# Patient Record
Sex: FEMALE | Race: WHITE | NOT HISPANIC OR LATINO | URBAN - METROPOLITAN AREA
[De-identification: names, ages, dates, MRNs, and addresses within clinical notes are randomized per-mention and may not be internally consistent; named-entity substitution may affect disease eponyms.]

---

## 2019-12-19 ENCOUNTER — INPATIENT (INPATIENT)
Facility: HOSPITAL | Age: 84
LOS: 3 days | Discharge: HOME | DRG: 917 | End: 2019-12-23
Attending: INTERNAL MEDICINE | Admitting: INTERNAL MEDICINE
Payer: MEDICARE

## 2019-12-19 VITALS
SYSTOLIC BLOOD PRESSURE: 176 MMHG | HEART RATE: 92 BPM | RESPIRATION RATE: 18 BRPM | WEIGHT: 125 LBS | DIASTOLIC BLOOD PRESSURE: 99 MMHG | HEIGHT: 60 IN

## 2019-12-19 LAB
ALBUMIN SERPL ELPH-MCNC: 3.6 G/DL — SIGNIFICANT CHANGE UP (ref 3.3–5)
ALP SERPL-CCNC: 96 U/L — SIGNIFICANT CHANGE UP (ref 40–120)
ALT FLD-CCNC: 14 U/L — SIGNIFICANT CHANGE UP (ref 10–45)
ANION GAP SERPL CALC-SCNC: 14 MMOL/L — SIGNIFICANT CHANGE UP (ref 5–17)
APPEARANCE UR: ABNORMAL
APTT BLD: 26.1 SEC — LOW (ref 27.5–36.3)
AST SERPL-CCNC: 32 U/L — SIGNIFICANT CHANGE UP (ref 10–40)
BASOPHILS # BLD AUTO: 0.03 K/UL — SIGNIFICANT CHANGE UP (ref 0–0.2)
BASOPHILS NFR BLD AUTO: 0.3 % — SIGNIFICANT CHANGE UP (ref 0–2)
BILIRUB SERPL-MCNC: 1 MG/DL — SIGNIFICANT CHANGE UP (ref 0.2–1.2)
BILIRUB UR-MCNC: NEGATIVE — SIGNIFICANT CHANGE UP
BUN SERPL-MCNC: 16 MG/DL — SIGNIFICANT CHANGE UP (ref 7–23)
CALCIUM SERPL-MCNC: 9.6 MG/DL — SIGNIFICANT CHANGE UP (ref 8.4–10.5)
CHLORIDE SERPL-SCNC: 99 MMOL/L — SIGNIFICANT CHANGE UP (ref 96–108)
CO2 SERPL-SCNC: 27 MMOL/L — SIGNIFICANT CHANGE UP (ref 22–31)
COLOR SPEC: YELLOW — SIGNIFICANT CHANGE UP
CREAT SERPL-MCNC: 1.08 MG/DL — SIGNIFICANT CHANGE UP (ref 0.5–1.3)
DIFF PNL FLD: ABNORMAL
EOSINOPHIL # BLD AUTO: 0.04 K/UL — SIGNIFICANT CHANGE UP (ref 0–0.5)
EOSINOPHIL NFR BLD AUTO: 0.4 % — SIGNIFICANT CHANGE UP (ref 0–6)
GLUCOSE SERPL-MCNC: 94 MG/DL — SIGNIFICANT CHANGE UP (ref 70–99)
GLUCOSE UR QL: NEGATIVE — SIGNIFICANT CHANGE UP
HCT VFR BLD CALC: 37.1 % — SIGNIFICANT CHANGE UP (ref 34.5–45)
HGB BLD-MCNC: 11.9 G/DL — SIGNIFICANT CHANGE UP (ref 11.5–15.5)
IMM GRANULOCYTES NFR BLD AUTO: 0.4 % — SIGNIFICANT CHANGE UP (ref 0–1.5)
INR BLD: 1.29 — HIGH (ref 0.88–1.16)
KETONES UR-MCNC: ABNORMAL MG/DL
LEUKOCYTE ESTERASE UR-ACNC: NEGATIVE — SIGNIFICANT CHANGE UP
LYMPHOCYTES # BLD AUTO: 1.97 K/UL — SIGNIFICANT CHANGE UP (ref 1–3.3)
LYMPHOCYTES # BLD AUTO: 20.3 % — SIGNIFICANT CHANGE UP (ref 13–44)
MCHC RBC-ENTMCNC: 30.6 PG — SIGNIFICANT CHANGE UP (ref 27–34)
MCHC RBC-ENTMCNC: 32.1 GM/DL — SIGNIFICANT CHANGE UP (ref 32–36)
MCV RBC AUTO: 95.4 FL — SIGNIFICANT CHANGE UP (ref 80–100)
MONOCYTES # BLD AUTO: 0.85 K/UL — SIGNIFICANT CHANGE UP (ref 0–0.9)
MONOCYTES NFR BLD AUTO: 8.7 % — SIGNIFICANT CHANGE UP (ref 2–14)
NEUTROPHILS # BLD AUTO: 6.79 K/UL — SIGNIFICANT CHANGE UP (ref 1.8–7.4)
NEUTROPHILS NFR BLD AUTO: 69.9 % — SIGNIFICANT CHANGE UP (ref 43–77)
NITRITE UR-MCNC: NEGATIVE — SIGNIFICANT CHANGE UP
NRBC # BLD: 0 /100 WBCS — SIGNIFICANT CHANGE UP (ref 0–0)
PH UR: 6.5 — SIGNIFICANT CHANGE UP (ref 5–8)
PLATELET # BLD AUTO: 211 K/UL — SIGNIFICANT CHANGE UP (ref 150–400)
POTASSIUM SERPL-MCNC: 3.6 MMOL/L — SIGNIFICANT CHANGE UP (ref 3.5–5.3)
POTASSIUM SERPL-SCNC: 3.6 MMOL/L — SIGNIFICANT CHANGE UP (ref 3.5–5.3)
PROT SERPL-MCNC: 6.6 G/DL — SIGNIFICANT CHANGE UP (ref 6–8.3)
PROT UR-MCNC: ABNORMAL MG/DL
PROTHROM AB SERPL-ACNC: 14.7 SEC — HIGH (ref 10–12.9)
RBC # BLD: 3.89 M/UL — SIGNIFICANT CHANGE UP (ref 3.8–5.2)
RBC # FLD: 14.5 % — SIGNIFICANT CHANGE UP (ref 10.3–14.5)
SODIUM SERPL-SCNC: 140 MMOL/L — SIGNIFICANT CHANGE UP (ref 135–145)
SP GR SPEC: 1.02 — SIGNIFICANT CHANGE UP (ref 1–1.03)
UROBILINOGEN FLD QL: 0.2 E.U./DL — SIGNIFICANT CHANGE UP
WBC # BLD: 9.72 K/UL — SIGNIFICANT CHANGE UP (ref 3.8–10.5)
WBC # FLD AUTO: 9.72 K/UL — SIGNIFICANT CHANGE UP (ref 3.8–10.5)

## 2019-12-19 PROCEDURE — 70450 CT HEAD/BRAIN W/O DYE: CPT | Mod: 26

## 2019-12-19 PROCEDURE — 72125 CT NECK SPINE W/O DYE: CPT | Mod: 26

## 2019-12-19 PROCEDURE — 71045 X-RAY EXAM CHEST 1 VIEW: CPT | Mod: 26

## 2019-12-19 PROCEDURE — 99285 EMERGENCY DEPT VISIT HI MDM: CPT

## 2019-12-19 PROCEDURE — 71260 CT THORAX DX C+: CPT | Mod: 26

## 2019-12-19 PROCEDURE — 74177 CT ABD & PELVIS W/CONTRAST: CPT | Mod: 26

## 2019-12-19 NOTE — ED ADULT NURSE NOTE - NSIMPLEMENTINTERV_GEN_ALL_ED
Implemented All Fall with Harm Risk Interventions:  [place] to call system. Call bell, personal items and telephone within reach. Instruct patient to call for assistance. Room bathroom lighting operational. Non-slip footwear when patient is off stretcher. Physically safe environment: no spills, clutter or unnecessary equipment. Stretcher in lowest position, wheels locked, appropriate side rails in place. Provide visual cue, wrist band, yellow gown, etc. Monitor gait and stability. Monitor for mental status changes and reorient to person, place, and time. Review medications for side effects contributing to fall risk. Reinforce activity limits and safety measures with patient and family. Provide visual clues: red socks.

## 2019-12-19 NOTE — ED PROVIDER NOTE - PHYSICAL EXAMINATION
CONSTITUTIONAL: Well appearing, well nourished, awake, alert and in no apparent distress.  HEENT: Head is atraumatic. Eyes clear bilaterally, normal EOMI. Airway patent.  CARDIAC: Normal rate, regular rhythm.  Heart sounds S1, S2.   RESPIRATORY: Breath sounds clear and equal bilaterally. no tachypnea, respiratory distress.   GASTROINTESTINAL: Abdomen soft, non-tender, no guarding, distension.  PELVIC: Prolapsed uterus  MUSCULOSKELETAL: Spine appears normal, no midline spinal tenderness, range of motion is not limited, no muscle or joint tenderness. no bony tenderness. no JVD, peripheral edema.   NEUROLOGICAL: Alert and oriented, no focal deficits, no motor or sensory deficits.  SKIN: Skin normal color for race, warm, dry and intact. No evidence of rash.  PSYCHIATRIC: Alert and oriented to person, place, time/situation. normal mood and affect. no apparent risk to self or others. CONSTITUTIONAL: Well appearing, well nourished, awake, alert and in no apparent distress.  HEENT: Head is atraumatic. Eyes clear bilaterally, normal EOMI. Airway patent.  CARDIAC: Normal rate, regular rhythm.  Heart sounds S1, S2.   RESPIRATORY: Breath sounds clear and equal bilaterally. no tachypnea, respiratory distress.   GASTROINTESTINAL: Abdomen soft, non-tender, no guarding, distension.  PELVIC: Prolapsed uterus  MUSCULOSKELETAL: Spine appears normal, no midline spinal tenderness, range of motion is not limited, no muscle or joint tenderness. no bony tenderness. no JVD, peripheral edema.   NEUROLOGICAL: o focal deficits, no motor or sensory deficits.  SKIN: Skin normal color for race, warm, dry and intact. No evidence of rash.

## 2019-12-19 NOTE — ED PROVIDER NOTE - CLINICAL SUMMARY MEDICAL DECISION MAKING FREE TEXT BOX
89 y/o F with a PMHx of HTN, and glaucoma p/w decreased PO intake and is also found to have bilateral pleural effusion. Pt w/ no respiratory distress noted, but will admit for continued PT OT. 89 y/o F with a PMHx of HTN, and glaucoma p/w decreased PO intake, s/p fall. Abnormal CXR followed up with CTs, also in light on unwitnessed fall and poor historian. CT w no acute traumatic injury, but large pleural effusions, daughter stating intermittently pt getting sob, but no sx at present, no hypoxia.

## 2019-12-19 NOTE — ED PROVIDER NOTE - OBJECTIVE STATEMENT
89 y/o F with a PMHx of HTN, and glaucoma presents to the ED s/p a mechanical fall 4 D ago. Pt's daughter reports hearing a thud in the kitchen and then seeing the pt on the floor. Daughter suspects that the pt fell asleep sitting on the kitchen table, and then subsequently fell off because the chair she was sleeping in did not have arms. Since then, the pt has decreased PO intake and also has difficulty getting up from her chair since yesterday. She also reports generalized weakness. Pt denies any other complaints. Daughter also noted a cough a recent diarrhea.

## 2019-12-19 NOTE — ED ADULT NURSE NOTE - OBJECTIVE STATEMENT
Patient sent in by Dr. Adler for weakness x 3 days. Per family at side, patient has only been drinking for 3 days and is no longer walking. Patient afebrile. Denies CP or SOB at this time. Patient sent in by Dr. Adler for weakness x 3 days. Per family at side, patient has only been drinking for 3 days and is no longer walking. Patient afebrile. Denies CP or SOB at this time. Patient asking for food during assessment.

## 2019-12-20 DIAGNOSIS — Z29.9 ENCOUNTER FOR PROPHYLACTIC MEASURES, UNSPECIFIED: ICD-10-CM

## 2019-12-20 DIAGNOSIS — M25.411 EFFUSION, RIGHT SHOULDER: ICD-10-CM

## 2019-12-20 DIAGNOSIS — G93.41 METABOLIC ENCEPHALOPATHY: ICD-10-CM

## 2019-12-20 DIAGNOSIS — R53.1 WEAKNESS: ICD-10-CM

## 2019-12-20 DIAGNOSIS — J90 PLEURAL EFFUSION, NOT ELSEWHERE CLASSIFIED: ICD-10-CM

## 2019-12-20 DIAGNOSIS — I10 ESSENTIAL (PRIMARY) HYPERTENSION: ICD-10-CM

## 2019-12-20 DIAGNOSIS — I48.91 UNSPECIFIED ATRIAL FIBRILLATION: ICD-10-CM

## 2019-12-20 DIAGNOSIS — Z91.89 OTHER SPECIFIED PERSONAL RISK FACTORS, NOT ELSEWHERE CLASSIFIED: ICD-10-CM
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
## 2019-12-20 DIAGNOSIS — E07.89 OTHER SPECIFIED DISORDERS OF THYROID: ICD-10-CM

## 2019-12-20 DIAGNOSIS — G92 TOXIC ENCEPHALOPATHY: ICD-10-CM

## 2019-12-20 DIAGNOSIS — H40.2223 CHRONIC ANGLE-CLOSURE GLAUCOMA, LEFT EYE, SEVERE STAGE: ICD-10-CM

## 2019-12-20 LAB
ANION GAP SERPL CALC-SCNC: 15 MMOL/L — SIGNIFICANT CHANGE UP (ref 5–17)
APTT BLD: 27.6 SEC — SIGNIFICANT CHANGE UP (ref 27.5–36.3)
BUN SERPL-MCNC: 20 MG/DL — SIGNIFICANT CHANGE UP (ref 7–23)
CALCIUM SERPL-MCNC: 8.9 MG/DL — SIGNIFICANT CHANGE UP (ref 8.4–10.5)
CHLORIDE SERPL-SCNC: 98 MMOL/L — SIGNIFICANT CHANGE UP (ref 96–108)
CO2 SERPL-SCNC: 24 MMOL/L — SIGNIFICANT CHANGE UP (ref 22–31)
CREAT SERPL-MCNC: 1.15 MG/DL — SIGNIFICANT CHANGE UP (ref 0.5–1.3)
GLUCOSE SERPL-MCNC: 117 MG/DL — HIGH (ref 70–99)
HCT VFR BLD CALC: 35.2 % — SIGNIFICANT CHANGE UP (ref 34.5–45)
HGB BLD-MCNC: 11.3 G/DL — LOW (ref 11.5–15.5)
INR BLD: 1.4 — HIGH (ref 0.88–1.16)
MAGNESIUM SERPL-MCNC: 1.8 MG/DL — SIGNIFICANT CHANGE UP (ref 1.6–2.6)
MCHC RBC-ENTMCNC: 30.7 PG — SIGNIFICANT CHANGE UP (ref 27–34)
MCHC RBC-ENTMCNC: 32.1 GM/DL — SIGNIFICANT CHANGE UP (ref 32–36)
MCV RBC AUTO: 95.7 FL — SIGNIFICANT CHANGE UP (ref 80–100)
NRBC # BLD: 0 /100 WBCS — SIGNIFICANT CHANGE UP (ref 0–0)
NT-PROBNP SERPL-SCNC: 6889 PG/ML — HIGH (ref 0–300)
PLATELET # BLD AUTO: 198 K/UL — SIGNIFICANT CHANGE UP (ref 150–400)
POTASSIUM SERPL-MCNC: 3.2 MMOL/L — LOW (ref 3.5–5.3)
POTASSIUM SERPL-SCNC: 3.2 MMOL/L — LOW (ref 3.5–5.3)
PROTHROM AB SERPL-ACNC: 16 SEC — HIGH (ref 10–12.9)
RBC # BLD: 3.68 M/UL — LOW (ref 3.8–5.2)
RBC # FLD: 14.6 % — HIGH (ref 10.3–14.5)
SODIUM SERPL-SCNC: 137 MMOL/L — SIGNIFICANT CHANGE UP (ref 135–145)
TSH SERPL-MCNC: 0.59 UIU/ML — SIGNIFICANT CHANGE UP (ref 0.35–4.94)
WBC # BLD: 9.38 K/UL — SIGNIFICANT CHANGE UP (ref 3.8–10.5)
WBC # FLD AUTO: 9.38 K/UL — SIGNIFICANT CHANGE UP (ref 3.8–10.5)

## 2019-12-20 PROCEDURE — 93306 TTE W/DOPPLER COMPLETE: CPT | Mod: 26

## 2019-12-20 PROCEDURE — 76536 US EXAM OF HEAD AND NECK: CPT | Mod: 26

## 2019-12-20 PROCEDURE — 72170 X-RAY EXAM OF PELVIS: CPT | Mod: 26,59

## 2019-12-20 PROCEDURE — 73522 X-RAY EXAM HIPS BI 3-4 VIEWS: CPT | Mod: 26

## 2019-12-20 PROCEDURE — 99223 1ST HOSP IP/OBS HIGH 75: CPT

## 2019-12-20 RX ORDER — LISINOPRIL 2.5 MG/1
40 TABLET ORAL ONCE
Refills: 0 | Status: COMPLETED | OUTPATIENT
Start: 2019-12-20 | End: 2019-12-20

## 2019-12-20 RX ORDER — MAGNESIUM SULFATE 500 MG/ML
2 VIAL (ML) INJECTION ONCE
Refills: 0 | Status: COMPLETED | OUTPATIENT
Start: 2019-12-20 | End: 2019-12-20

## 2019-12-20 RX ORDER — ACETAMINOPHEN 500 MG
650 TABLET ORAL EVERY 6 HOURS
Refills: 0 | Status: DISCONTINUED | OUTPATIENT
Start: 2019-12-20 | End: 2019-12-23

## 2019-12-20 RX ORDER — LISINOPRIL 2.5 MG/1
1 TABLET ORAL
Qty: 0 | Refills: 0 | DISCHARGE

## 2019-12-20 RX ORDER — ENOXAPARIN SODIUM 100 MG/ML
30 INJECTION SUBCUTANEOUS EVERY 24 HOURS
Refills: 0 | Status: DISCONTINUED | OUTPATIENT
Start: 2019-12-20 | End: 2019-12-23

## 2019-12-20 RX ORDER — MIRTAZAPINE 45 MG/1
7.5 TABLET, ORALLY DISINTEGRATING ORAL AT BEDTIME
Refills: 0 | Status: DISCONTINUED | OUTPATIENT
Start: 2019-12-20 | End: 2019-12-23

## 2019-12-20 RX ORDER — LISINOPRIL 2.5 MG/1
20 TABLET ORAL EVERY 24 HOURS
Refills: 0 | Status: DISCONTINUED | OUTPATIENT
Start: 2019-12-20 | End: 2019-12-23

## 2019-12-20 RX ORDER — ACETAMINOPHEN 500 MG
650 TABLET ORAL ONCE
Refills: 0 | Status: COMPLETED | OUTPATIENT
Start: 2019-12-20 | End: 2019-12-20

## 2019-12-20 RX ORDER — POTASSIUM CHLORIDE 20 MEQ
40 PACKET (EA) ORAL EVERY 4 HOURS
Refills: 0 | Status: COMPLETED | OUTPATIENT
Start: 2019-12-20 | End: 2019-12-20

## 2019-12-20 RX ADMIN — Medication 650 MILLIGRAM(S): at 07:13

## 2019-12-20 RX ADMIN — Medication 650 MILLIGRAM(S): at 06:13

## 2019-12-20 RX ADMIN — Medication 40 MILLIEQUIVALENT(S): at 12:26

## 2019-12-20 RX ADMIN — Medication 40 MILLIEQUIVALENT(S): at 17:47

## 2019-12-20 RX ADMIN — Medication 650 MILLIGRAM(S): at 14:43

## 2019-12-20 RX ADMIN — ENOXAPARIN SODIUM 30 MILLIGRAM(S): 100 INJECTION SUBCUTANEOUS at 12:25

## 2019-12-20 RX ADMIN — Medication 50 GRAM(S): at 07:55

## 2019-12-20 RX ADMIN — Medication 650 MILLIGRAM(S): at 02:55

## 2019-12-20 RX ADMIN — LISINOPRIL 40 MILLIGRAM(S): 2.5 TABLET ORAL at 01:57

## 2019-12-20 RX ADMIN — Medication 650 MILLIGRAM(S): at 01:49

## 2019-12-20 NOTE — H&P ADULT - PROBLEM SELECTOR PLAN 4
EKG in ED with Afib 82bph, incomplete LBBB, left axis deviation, , poor R wave progression. No known Afib hx. CHADsVAS 2-3 would qualify for AC however as recurrent falls and joint effusion will hold AC tonight and discuss with family in AM  -AM EKG

## 2019-12-20 NOTE — H&P ADULT - NSHPPHYSICALEXAM_GEN_ALL_CORE
.  VITAL SIGNS:  T(C): 36.6 (12-19-19 @ 23:40), Max: 36.6 (12-19-19 @ 23:40)  T(F): 97.9 (12-19-19 @ 23:40), Max: 97.9 (12-19-19 @ 23:40)  HR: 84 (12-19-19 @ 23:40) (64 - 92)  BP: 178/100 (12-19-19 @ 23:40) (139/85 - 178/100)  BP(mean): --  RR: 18 (12-19-19 @ 23:40) (18 - 18)  SpO2: 98% (12-19-19 @ 23:40) (98% - 98%)  Wt(kg): --    PHYSICAL EXAM:    Constitutional: elderly frail, agitated woman in bed, inconsolable   Head: NC/AT  Eyes: PERRL, EOMI, anicteric sclera  ENT: no nasal discharge; uvula midline, no oropharyngeal erythema or exudates; MMM  Neck: supple; no JVD or thyromegaly  Respiratory: decreased breath sounds   Cardiac: +S1/S2; RRR; no M/R/G; PMI non-displaced  Gastrointestinal: soft, NT/ND; no rebound or guarding; +BSx4  Genitourinary: normal external genitalia  Back: spine midline, no bony tenderness or step-offs; no CVAT B/L  Extremities: WWP, no clubbing or cyanosis; no peripheral edema  Musculoskeletal: NROM x4; no joint swelling, tenderness or erythema  Vascular: 2+ radial, femoral, DP/PT pulses B/L  Dermatologic: skin warm, dry and intact; no rashes, wounds, or scars  Lymphatic: no submandibular or cervical LAD  Neurologic: AAOx3; CNII-XII grossly intact; no focal deficits  Psychiatric: affect and characteristics of appearance, verbalizations, behaviors are appropriate

## 2019-12-20 NOTE — H&P ADULT - PROBLEM SELECTOR PLAN 5
Advanced right glenohumeral arthrosis with large joint effusion. Unable to assess if acute vs chronic, moving all extremities, c/o pain "all over"   -Call Ortho in AM to assess if joint is candidate for tap   -Tylenol for pain control

## 2019-12-20 NOTE — H&P ADULT - PROBLEM SELECTOR PROBLEM 4
R/O Atrial fibrillation with controlled ventricular rate Atrial fibrillation with controlled ventricular rate

## 2019-12-20 NOTE — H&P ADULT - PROBLEM SELECTOR PLAN 7
Reports hx of poorly controlled HTN w/ glaucoma 2/2 HTN. BPs above goal.   -C/w Lisinopril 20    #CKD3  Reduced GFR stage 3, suspect CKD, will trend Reports hx of poorly controlled HTN w/ glaucoma 2/2 HTN. BPs above goal.   -C/w Lisinopril 20    #CKD3  Reduced GFR stage 3, suspect CKD, will trend for now

## 2019-12-20 NOTE — H&P ADULT - PROBLEM SELECTOR PLAN 1
Daughter reports behavioral disturbance worsening acutely with levofloxacin, known rare side effect, also took one xanax which also worsened behavior. Pt with vaginal prolapse and recurrent UTI, UA negative but could be sterilized by levo use.  will hold neuroleptics   -Monitor clinically   -Serial EKGs

## 2019-12-20 NOTE — H&P ADULT - HISTORY OF PRESENT ILLNESS
89F PMH poorly controlled HTN, left eye glaucoma, dementia with sundowning  presents for weakness and decreased po intake starting one week after unwitnessed fall. Daughters report they heard her fall in the kitchen, and suspected she fell asleep and slumped out of chair. Pt was initially her usual self then 2-3d later started having difficulty getting out of chair, decreased po intake. Found T99.2, and spoke to RN who ordered Levofloxacin 500 BID for suspected UTI. Of note, has new cough and chronic difficulty sleeping flat (unclear if positional or orthopnea) Daughters noticed her behavioral issues worsened with starting Levofloxacin, yesterday tried Xanax which exacerbated behavior issues. Per daughters no change to urinary habits, has chronic constipation. At her baseline: AOx2, conversive but behavior disturbances at night and sundowning. No recent illnesses or hospitalizations. Pt c/o back pain, agitated, ROS not obtainable.     T97.5, HR 64-92, -178/, RR 18, SpO2 98%  WBC: 9.72. Hb 11.9, ProBNP 6889, UA negative   EKG: rate 82, atrial fibrillation, left axis deviation, incomplete LBBB , 89F PMH poorly controlled HTN, left eye glaucoma, dementia with sundowning  presents for weakness and decreased po intake starting one week after unwitnessed fall. Daughters report they heard her fall in the kitchen, and suspected she fell asleep and slumped out of chair. Pt was initially her usual self then 2-3d later started having difficulty getting out of chair, decreased po intake. Found T99.2, and spoke to RN who ordered Levofloxacin 500 BID for suspected UTI. Of note, has new cough and chronic difficulty sleeping flat (unclear if positional or orthopnea) Daughters noticed her behavioral issues worsened with starting Levofloxacin, yesterday tried Xanax which exacerbated behavior issues. Per daughters no change to urinary habits, has chronic constipation. At her baseline: AOx2, conversive but behavior disturbances at night and sundowning. No recent illnesses or hospitalizations. Pt c/o back pain, agitated, ROS not obtainable.     T97.5, HR 64-92, -178/, RR 18, SpO2 98%  WBC: 9.72. Hb 11.9, ProBNP 6889, UA negative   EKG: rate 82, atrial fibrillation, left axis deviation, incomplete LBBB, ,

## 2019-12-20 NOTE — H&P ADULT - ATTENDING COMMENTS
well summarized above, and have reviewed in detail with family,, and to extent possible, with pt.   pt is 89 yo lady with dementia, though she is able to have meaningful conversation (even without orientation to time and place) in Serbian, but she speaks no English .   pt fell at home ( dtrs report repeated falls and ongoing risk of fall).  dtrs report recent change in mental status , with more confusion and agitation than her normal baseline.  she also is eating poorly, and has terrible dentition complicated by inability to use her dentures    she had a recent course of levoquin for fevers without clear source or dx.   w/u so far shows no acute cns pathol   pt does have bilat pleural effusions.   she c/o left hip and femur pain, and is in flexion posture which precludes optimal visualization. echo done and results reviewed.   a fib noted but given fall risk prob not candidiate for a/c   will consult cardiol     have attempted to discuss goals of therapy with daughters, but they are, in all matters thus far, very ambivalent--- unhappy with mother's status quo, but unwilling to employ eval or rx which may help her issues.  for now we will request ortho consult, consider more imaging and perhaps bone scan, begin mirtazapine, and discuss with cardiol.  I have indicated my preference to defer on diagnostic thoracentesis at this time and they are agreeable.

## 2019-12-20 NOTE — H&P ADULT - PROBLEM SELECTOR PLAN 6
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   -F/u TSH    #Hiatal hernia   Massive hiatal hernia with intrathoracic stomach. Daughters deny hx of GERD

## 2019-12-20 NOTE — H&P ADULT - PROBLEM SELECTOR PLAN 3
Pt presents with cough, can't sleep flat but unclear if due to orthopnea or chronic back pain issues. CT with large right and moderate left pleural effusions and dilated cardiac atria. Suspect fluid overload 2/2 CHF. Pt no CAD dx, not on ASA/statin, has reported rash to toprol   -AM EKG   -F/u echo

## 2019-12-20 NOTE — H&P ADULT - PROBLEM SELECTOR PLAN 2
Unwitnessed fall. Currently delirious, unable to question. CT head with microangiopathic disease negative for acute process  -F/u TSH, B12, folate, RPR  -F/u PT consult     #Weakness   Suspect multifactorial due to TME and dementia baseline. Meeting 1/4 SIRS criteria with HR 92, low suspicion infection   -F/u TSH, B12, folate, RPR

## 2019-12-20 NOTE — CONSULT NOTE ADULT - SUBJECTIVE AND OBJECTIVE BOX
Pt Name: SOL FAUSTIN  MRN: [id number]      Pt is a 89yo Female admitted for change in behavior and not eating recently. Per daughter pt lives with her and heard her fall 1 week ago. Daughter found pt. on the floor in which she called family (EMT) and was able to put her back in a chair. Pt. was able to ambulate after fall in which she mobilizes with cane and uses chair around the house as a walker. Daughter noticed patient not able to lift up her right leg and not able to walk at this time. Pt. has private NP for home visits in which they suspected UTI and started on levofloxacin 500mg bid. Daughter states patient also has not been eating as much. Ortho consulted to r/o hip fracture. Daughter states pt. also has dementia.       T97.5, HR 64-92, -178/, RR 18, SpO2 98%  WBC: 9.72. Hb 11.9, ProBNP 6889, UA negative   EKG: rate 82, atrial fibrillation, left axis deviation, incomplete LBBB, , (20 Dec 2019 00:12)        ROS is otherwise negative.    PAST MEDICAL & SURGICAL HISTORY:  Vaginal prolapse  Glaucoma  HTN (hypertension)  No significant past surgical history      AllergiesNo Known Allergies  Toprol-XL (Rash)      Intolerances    Medications:acetaminophen   Tablet .. 650 milliGRAM(s) Oral every 6 hours PRN  enoxaparin Injectable 30 milliGRAM(s) SubCutaneous every 24 hours  lisinopril 20 milliGRAM(s) Oral every 24 hours  mirtazapine 7.5 milliGRAM(s) Oral at bedtime      PHYSICAL EXAM:    T(C): 36.8 (12-20-19 @ 17:47), Max: 36.8 (12-20-19 @ 14:19)  HR: 100 (12-20-19 @ 17:47) (64 - 101)  BP: 105/67 (12-20-19 @ 17:47) (105/67 - 178/100)  RR: 16 (12-20-19 @ 17:47) (16 - 18)  SpO2: 98% (12-20-19 @ 17:47) (96% - 98%)  Wt(kg): --    Gen: Pt. slumped to the side in stretcher, legs flexed. able to converse but hard to follow commands, will follow in Danish from daughter at times.   Neurological: no focal deficits  Skin: no rash on visible skin, no sts/ecchymosis/sts  Musculoskeletal: Right le +TTP along right thigh soft tissue region, mild bony tenderness along femur, no ttp along patella/fibula/tibia/ ankle region. SLT INTACT, DP/PT 2+,  QUAD/HAM/EHL/TA/GS 4/5 hard get patient to follow commands during physical exam. Left le skin intact, no bony tenderness no deformity,  SLT INTACT, DP/PT 2+, EHL/TA/GS   DP/PT  2+, SLT intact b/l les   ROM: pt. able to flex b/l knees to 90, hip flexion 45  Negative log roll, able to have patient lay down supine with legs straighted, right knee stays flexed/contractured      Imaging Studies: CT and xray negative for hip fracture    A/P:  Pt is a 89yo Female s/p fall r/o hip fracture  -D/W Dr. Schaefer  -xrays unclear to r/o fracture along with rotation of patient, will repeat and get dedicated xray of right hip  -ortho to f/u when imaging completed  -bedrest at this time until fracture r/o completely  - pain control   -appreciated recs per primary team for all other medical issues

## 2019-12-20 NOTE — H&P ADULT - ASSESSMENT
89F PMH poorly controlled HTN, left eye glaucoma, dementia with sundowning  presents for weakness, decreased po intake and agitation starting one week after unwitnessed fall. Suspect TME 2/2 suspected polypharmacy (levofloxacin and xanax use) with bilateral pleural effusion and elevated BNP likely 2/2 longstanding HTN and undiagnosed CHF. 89F PMH poorly controlled HTN, left eye glaucoma, dementia with sundowning  presents for weakness, decreased po intake and agitation starting one week after unwitnessed fall. Suspect TME 2/2 suspected polypharmacy (levofloxacin and xanax use) with bilateral pleural effusion and elevated BNP likely 2/2 longstanding HTN and undiagnosed CHF. Also incidental new Afib.

## 2019-12-21 DIAGNOSIS — R62.7 ADULT FAILURE TO THRIVE: ICD-10-CM

## 2019-12-21 LAB
ANION GAP SERPL CALC-SCNC: 14 MMOL/L — SIGNIFICANT CHANGE UP (ref 5–17)
BASOPHILS # BLD AUTO: 0.04 K/UL — SIGNIFICANT CHANGE UP (ref 0–0.2)
BASOPHILS NFR BLD AUTO: 0.5 % — SIGNIFICANT CHANGE UP (ref 0–2)
BUN SERPL-MCNC: 20 MG/DL — SIGNIFICANT CHANGE UP (ref 7–23)
CALCIUM SERPL-MCNC: 9.1 MG/DL — SIGNIFICANT CHANGE UP (ref 8.4–10.5)
CHLORIDE SERPL-SCNC: 101 MMOL/L — SIGNIFICANT CHANGE UP (ref 96–108)
CO2 SERPL-SCNC: 24 MMOL/L — SIGNIFICANT CHANGE UP (ref 22–31)
CREAT SERPL-MCNC: 1.16 MG/DL — SIGNIFICANT CHANGE UP (ref 0.5–1.3)
CULTURE RESULTS: NO GROWTH — SIGNIFICANT CHANGE UP
EOSINOPHIL # BLD AUTO: 0.07 K/UL — SIGNIFICANT CHANGE UP (ref 0–0.5)
EOSINOPHIL NFR BLD AUTO: 0.9 % — SIGNIFICANT CHANGE UP (ref 0–6)
FOLATE SERPL-MCNC: 4.4 NG/ML — LOW
GLUCOSE SERPL-MCNC: 84 MG/DL — SIGNIFICANT CHANGE UP (ref 70–99)
HCT VFR BLD CALC: 32.2 % — LOW (ref 34.5–45)
HGB BLD-MCNC: 10.7 G/DL — LOW (ref 11.5–15.5)
IMM GRANULOCYTES NFR BLD AUTO: 0.5 % — SIGNIFICANT CHANGE UP (ref 0–1.5)
LYMPHOCYTES # BLD AUTO: 1.76 K/UL — SIGNIFICANT CHANGE UP (ref 1–3.3)
LYMPHOCYTES # BLD AUTO: 22.3 % — SIGNIFICANT CHANGE UP (ref 13–44)
MAGNESIUM SERPL-MCNC: 2.4 MG/DL — SIGNIFICANT CHANGE UP (ref 1.6–2.6)
MCHC RBC-ENTMCNC: 31.2 PG — SIGNIFICANT CHANGE UP (ref 27–34)
MCHC RBC-ENTMCNC: 33.2 GM/DL — SIGNIFICANT CHANGE UP (ref 32–36)
MCV RBC AUTO: 93.9 FL — SIGNIFICANT CHANGE UP (ref 80–100)
MONOCYTES # BLD AUTO: 0.78 K/UL — SIGNIFICANT CHANGE UP (ref 0–0.9)
MONOCYTES NFR BLD AUTO: 9.9 % — SIGNIFICANT CHANGE UP (ref 2–14)
NEUTROPHILS # BLD AUTO: 5.19 K/UL — SIGNIFICANT CHANGE UP (ref 1.8–7.4)
NEUTROPHILS NFR BLD AUTO: 65.9 % — SIGNIFICANT CHANGE UP (ref 43–77)
NRBC # BLD: 0 /100 WBCS — SIGNIFICANT CHANGE UP (ref 0–0)
PLATELET # BLD AUTO: 200 K/UL — SIGNIFICANT CHANGE UP (ref 150–400)
POTASSIUM SERPL-MCNC: 3.2 MMOL/L — LOW (ref 3.5–5.3)
POTASSIUM SERPL-SCNC: 3.2 MMOL/L — LOW (ref 3.5–5.3)
RBC # BLD: 3.43 M/UL — LOW (ref 3.8–5.2)
RBC # FLD: 14.8 % — HIGH (ref 10.3–14.5)
SODIUM SERPL-SCNC: 139 MMOL/L — SIGNIFICANT CHANGE UP (ref 135–145)
SPECIMEN SOURCE: SIGNIFICANT CHANGE UP
T PALLIDUM AB TITR SER: NEGATIVE — SIGNIFICANT CHANGE UP
T PALLIDUM AB TITR SER: NEGATIVE — SIGNIFICANT CHANGE UP
VIT B12 SERPL-MCNC: 369 PG/ML — SIGNIFICANT CHANGE UP (ref 232–1245)
WBC # BLD: 7.88 K/UL — SIGNIFICANT CHANGE UP (ref 3.8–10.5)
WBC # FLD AUTO: 7.88 K/UL — SIGNIFICANT CHANGE UP (ref 3.8–10.5)

## 2019-12-21 PROCEDURE — 99221 1ST HOSP IP/OBS SF/LOW 40: CPT

## 2019-12-21 RX ORDER — FOLIC ACID 0.8 MG
1 TABLET ORAL DAILY
Refills: 0 | Status: DISCONTINUED | OUTPATIENT
Start: 2019-12-21 | End: 2019-12-23

## 2019-12-21 RX ORDER — POTASSIUM CHLORIDE 20 MEQ
40 PACKET (EA) ORAL EVERY 4 HOURS
Refills: 0 | Status: COMPLETED | OUTPATIENT
Start: 2019-12-21 | End: 2019-12-21

## 2019-12-21 RX ORDER — POLYETHYLENE GLYCOL 3350 17 G/17G
17 POWDER, FOR SOLUTION ORAL EVERY 12 HOURS
Refills: 0 | Status: DISCONTINUED | OUTPATIENT
Start: 2019-12-21 | End: 2019-12-23

## 2019-12-21 RX ORDER — SENNA PLUS 8.6 MG/1
2 TABLET ORAL AT BEDTIME
Refills: 0 | Status: DISCONTINUED | OUTPATIENT
Start: 2019-12-21 | End: 2019-12-23

## 2019-12-21 RX ADMIN — Medication 1 MILLIGRAM(S): at 19:26

## 2019-12-21 RX ADMIN — SENNA PLUS 2 TABLET(S): 8.6 TABLET ORAL at 22:32

## 2019-12-21 RX ADMIN — LISINOPRIL 20 MILLIGRAM(S): 2.5 TABLET ORAL at 06:09

## 2019-12-21 RX ADMIN — POLYETHYLENE GLYCOL 3350 17 GRAM(S): 17 POWDER, FOR SOLUTION ORAL at 22:32

## 2019-12-21 RX ADMIN — ENOXAPARIN SODIUM 30 MILLIGRAM(S): 100 INJECTION SUBCUTANEOUS at 09:03

## 2019-12-21 RX ADMIN — Medication 40 MILLIEQUIVALENT(S): at 10:24

## 2019-12-21 RX ADMIN — Medication 40 MILLIEQUIVALENT(S): at 09:03

## 2019-12-21 NOTE — CONSULT NOTE ADULT - ATTENDING COMMENTS
On Date 12/21/19  Assessment: Patient personally seen and examined myself, face-to-face, during rounds with the Resident     Note read, including vitals, physical findings, laboratory data, and radiological reports.   Agree with above.

## 2019-12-21 NOTE — CHART NOTE - NSCHARTNOTEFT_GEN_A_CORE
Upon Nutritional Assessment by the Registered Dietitian your patient was determined to meet criteria / has evidence of the following diagnosis/diagnoses:          [ ]  Mild Protein Calorie Malnutrition        [ ]  Moderate Protein Calorie Malnutrition        [X ] Severe Protein Calorie Malnutrition        [ ] Unspecified Protein Calorie Malnutrition        [ ] Underweight / BMI <19        [ ] Morbid Obesity / BMI > 40      Findings as based on:  •  Comprehensive nutrition assessment and consultation    Per physical assessment: Muscle Wasting- Temporal [ X  ]  Clavicle/Pectoral [ X  ]  Shoulder/Deltoid [ X  ]  Scapula [   ]  Interosseous [ X  ]  Quadriceps [ X  ]  Gastrocnemius [  X ]; Fat Wasting- Orbital [ X  ]  Buccal [ X  ]  Triceps [  X ]  Rib [ X  ]--> Suspect severe protein-calorie malnutrition 2/2 to physical assessment, and inadequate energy intake of <50% for >5 days       Treatment:    The following diet has been recommended:    Recommend liberalizing diet to Low Sodium with Ensure 1x/day (350kcal, 20g pro)  Pain and bowel regimens per team  Continue with remeron if no contraindications       PROVIDER Section:     By signing this assessment you are acknowledging and agree with the diagnosis/diagnoses assigned by the Registered Dietitian    Comments:

## 2019-12-21 NOTE — DIETITIAN INITIAL EVALUATION ADULT. - OTHER INFO
89 yo/female with PMHx HTN, L. eye glaucoma, dementia, presented with weakness, AMS, and poor PO intake. TME likely 2/2 polypharmacy, and found to have B/L pleural effusions w/elevated BNP and new Afib. Pt sen in room, asleep, left to rest. Daughter at bedside. She endorsed that pt has had poor intake for ~1 week, though usually has a good appetite. She eats small meals, and enjoys bread, yogurt, applesauce, Ensure. She prefers softer/moister foods as they are easier for her to chew and swallow. 0% intake this AM at breakfast reported. Started on remeron as appetite stimulant. No usual complaints of N/V but does have chronic constipation, requiring suppositories and senna. NKFA. Skin intact pressure-wise. No pain reported earlier. UBW of 125-130# endorsed; bedscale reading 116#. Pt's daughter declined NFPE at time of visit as she was sleeping, though pt evidently has severe protein-calorie malnutrition per visual assessment. Discussed liberalizing diet, small, frequent meals, and encouragement. Her daughter was amenable to pt receiving Ensure Enlive. Will continue to follow per RD protocol.

## 2019-12-21 NOTE — CONSULT NOTE ADULT - SUBJECTIVE AND OBJECTIVE BOX
PAST MEDICAL & SURGICAL HISTORY:  Vaginal prolapse  Glaucoma  HTN (hypertension)  No significant past surgical history      Home Medications:  lisinopril 20 mg oral tablet: 1 tab(s) orally once a day (20 Dec 2019 01:32)      MEDICATIONS  (STANDING):  enoxaparin Injectable 30 milliGRAM(s) SubCutaneous every 24 hours  folic acid 1 milliGRAM(s) Oral daily  lisinopril 20 milliGRAM(s) Oral every 24 hours  mirtazapine 7.5 milliGRAM(s) Oral at bedtime  polyethylene glycol 3350 17 Gram(s) Oral every 12 hours  senna 2 Tablet(s) Oral at bedtime    MEDICATIONS  (PRN):  acetaminophen   Tablet .. 650 milliGRAM(s) Oral every 6 hours PRN Mild Pain (1 - 3)      .  VITAL SIGNS:  T(C): 36.3 (12-21-19 @ 20:55), Max: 36.7 (12-21-19 @ 06:00)  T(F): 97.4 (12-21-19 @ 20:55), Max: 98 (12-21-19 @ 06:00)  HR: 83 (12-21-19 @ 20:55) (78 - 83)  BP: 158/85 (12-21-19 @ 20:55) (136/70 - 158/85)  BP(mean): --  RR: 17 (12-21-19 @ 20:55) (16 - 17)  SpO2: 94% (12-21-19 @ 20:55) (94% - 98%)  Wt(kg): --    PHYSICAL EXAM:    Constitutional: WDWN resting comfortably in bed; NAD  Head: NC/AT  Eyes: PERRL, EOMI, anicteric sclera  ENT: no nasal discharge; uvula midline, no oropharyngeal erythema or exudates; MMM  Neck: supple; no JVD or thyromegaly  Respiratory: CTA B/L; no W/R/R, no retractions  Cardiac: +S1/S2; RRR; no M/R/G; PMI non-displaced  Gastrointestinal: soft, NT/ND; no rebound or guarding; +BSx4  Genitourinary: normal external genitalia  Back: spine midline, no bony tenderness or step-offs; no CVAT B/L  Extremities: WWP, no clubbing or cyanosis; no peripheral edema  Musculoskeletal: NROM x4; no joint swelling, tenderness or erythema  Vascular: 2+ radial, femoral, DP/PT pulses B/L  Dermatologic: skin warm, dry and intact; no rashes, wounds, or scars  Lymphatic: no submandibular or cervical LAD  Neurologic: AAOx3; CNII-XII grossly intact; no focal deficits  Psychiatric: affect and characteristics of appearance, verbalizations, behaviors are appropriate    .  LABS:                         10.7   7.88  )-----------( 200      ( 21 Dec 2019 06:26 )             32.2     12-21    139  |  101  |  20  ----------------------------<  84  3.2<L>   |  24  |  1.16    Ca    9.1      21 Dec 2019 06:26  Mg     2.4     12-21      PT/INR - ( 20 Dec 2019 05:51 )   PT: 16.0 sec;   INR: 1.40          PTT - ( 20 Dec 2019 05:51 )  PTT:27.6 sec        RADIOLOGY, EKG & ADDITIONAL TESTS: Reviewed.     < from: Echocardiogram (12.20.19 @ 09:12) >  CONCLUSIONS:     1. Hyperdynamic left ventricular systolic function.   2. Normal right ventricular size and systolic function.   3. Mild aortic regurgitation.   4. Aortic sclerosis without significant stenosis.   5. Trivial pericardial effusion.    < end of copied text > 89F PMH poorly controlled HTN, left eye glaucoma, dementia with sundowning  presents for weakness and decreased po intake starting one week after unwitnessed fall. Daughters report they heard her fall in the kitchen, and suspected she fell asleep and slumped out of chair. Pt was initially her usual self then 2-3d later started having difficulty getting out of chair, decreased po intake. Found T99.2, and spoke to RN who ordered Levofloxacin 500 BID for suspected UTI. Of note, has new cough and chronic difficulty sleeping flat (unclear if positional or orthopnea) Daughters noticed her behavioral issues worsened with starting Levofloxacin, yesterday tried Xanax which exacerbated behavior issues. Per daughters no change to urinary habits, has chronic constipation. At her baseline: AOx2, conversive but behavior disturbances at night and .     Cardiology consulted for New Onset Afib.  Patient seen and examined at bedside with daughter presents. Daughter who is very familiar with mothers history reports that Her mother was told by a [facility] Cardiologist > 20 years ago that she had Afib. At that time it appears her rate was controlled and they suggested follow up. Daughter reports lost to follow up with that Physician. She does not recall if AC was discussed and suggested. When asked today if they would consider AC for their mother should it be recommended, daughter states she does not want her mom on AC and doesnt think it will be beneficial to her.    Patient denies chest pain or palpirations. Interrupts interview frequently to ask for chocalate or bowel regimen. States she is otherwise ok.       PAST MEDICAL & SURGICAL HISTORY:  Vaginal prolapse  Glaucoma  HTN (hypertension)  No significant past surgical history      Home Medications:  lisinopril 20 mg oral tablet: 1 tab(s) orally once a day (20 Dec 2019 01:32)      MEDICATIONS  (STANDING):  enoxaparin Injectable 30 milliGRAM(s) SubCutaneous every 24 hours  folic acid 1 milliGRAM(s) Oral daily  lisinopril 20 milliGRAM(s) Oral every 24 hours  mirtazapine 7.5 milliGRAM(s) Oral at bedtime  polyethylene glycol 3350 17 Gram(s) Oral every 12 hours  senna 2 Tablet(s) Oral at bedtime    MEDICATIONS  (PRN):  acetaminophen   Tablet .. 650 milliGRAM(s) Oral every 6 hours PRN Mild Pain (1 - 3)      .  VITAL SIGNS:  T(C): 36.3 (12-21-19 @ 20:55), Max: 36.7 (19 @ 06:00)  T(F): 97.4 (19 @ 20:55), Max: 98 (19 @ 06:00)  HR: 83 (19 @ 20:55) (78 - 83)  BP: 158/85 (19 @ 20:55) (136/70 - 158/85)  BP(mean): --  RR: 17 (19 @ 20:55) (16 - 17)  SpO2: 94% (19 @ 20:55) (94% - 98%)  Wt(kg): --    PHYSICAL EXAM:    Constitutional: Frail Elderly lady, mildly contracted in bed in NAD  Head: NC/AT  ENT: Dry MM; Poor Dentition  Neck: supple; no JVD   Respiratory: CTA in apeces; dec BS at the bases R>L. Poor inspiratory effort. No egophony  Cardiac: +S1/S2; irregular RR; no M/R/G;   Gastrointestinal: soft, Mildly Distended; Non tender; no rebound or guarding; mildly dec BS  Extremities: WWP, ; no peripheral edema  Vascular: 2+ radial, , DP/PT pulses B/L  Neurologic: AAOxself. Unable to state year, . Knows she is somewhere where shes being cared for,     .  LABS:                         10.7   7.88  )-----------( 200      ( 21 Dec 2019 06:26 )             32.2     12-    139  |  101  |  20  ----------------------------<  84  3.2<L>   |  24  |  1.16    Ca    9.1      21 Dec 2019 06:26  Mg     2.4           PT/INR - ( 20 Dec 2019 05:51 )   PT: 16.0 sec;   INR: 1.40          PTT - ( 20 Dec 2019 05:51 )  PTT:27.6 sec        RADIOLOGY, EKG & ADDITIONAL TESTS: Reviewed.     < from: Echocardiogram (19 @ 09:12) >  CONCLUSIONS:     1. Hyperdynamic left ventricular systolic function.   2. Normal right ventricular size and systolic function.   3. Mild aortic regurgitation.   4. Aortic sclerosis without significant stenosis.   5. Trivial pericardial effusion.    < end of copied text >

## 2019-12-21 NOTE — PROGRESS NOTE ADULT - SUBJECTIVE AND OBJECTIVE BOX
CC: WEAKNESS; PLEURAL EFFUSION      INTERVAL HISTORY:  lying in bed  responsive to name  daughters by her bedside      ROS: No chest pain, no sob, no abd pain. No n/v/d    PAST MEDICAL & SURGICAL HISTORY:  Vaginal prolapse  Glaucoma  HTN (hypertension)  No significant past surgical history      PHYSICAL EXAM:  T(C): 36.7 (19 @ 06:00), Max: 36.8 (19 @ 14:19)  HR: 83 (19 @ 06:00)  BP: 158/67 (19 @ 06:00) (105/67 - 158/67)  RR: 17 (19 @ 06:00)  SpO2: 95% (19 @ 06:00)  Wt(kg): --  I&O's Summary    Weight 56.7 ( @ 18:03)  General:   NAD.  HEENT: moist mucous membranes, no pallor/cyanosis.  Neck: no JVD visible.  Cardiac: S1, S2. RRR. No murmurs   Respratory: CTA b/l, no access muscle use.   Abdomen: soft. nontender. nondistended  Skin: no rashes.  Extremities: no LE edema b/l  Access:       DATA:                        10.7<L>  7.88  )-----------( 200      ( 21 Dec 2019 06:26 )             32.2<L>        139    |  101    |  20     ----------------------------<  84     Ca:9.1   (21 Dec 2019 06:26)  3.2<L>   |  24     |  1.16       eGFR if Non : 40 <L>  eGFR if : 46 <L>    TPro  6.6    /  Alb  3.6    /  TBili  1.0    /  DBili  x      /  AST  32     /  ALT  14     /  AlkPhos  96     19 Dec 2019 19:04        Urinalysis Basic - ( 19 Dec 2019 19:04 )  Color: Yellow / Appearance: SL Cloudy<!> / S.025 / pH: x  Gluc: x / Ketone: Trace mg/dL<!>  / Bili: Negative / Urobili: 0.2 E.U./dL   Blood: x / Protein: Trace mg/dL<!> / Nitrite: NEGATIVE   Leuk Esterase: NEGATIVE / RBC: < 5 /HPF / WBC < 5 /HPF   Sq Epi: x / Non Sq Epi: 0-5 /HPF / Bacteria: Present /HPF<!>                MEDICATIONS  (STANDING):  enoxaparin Injectable 30 milliGRAM(s) SubCutaneous every 24 hours  lisinopril 20 milliGRAM(s) Oral every 24 hours  mirtazapine 7.5 milliGRAM(s) Oral at bedtime  potassium chloride   Powder 40 milliEquivalent(s) Oral every 4 hours    MEDICATIONS  (PRN):  acetaminophen   Tablet .. 650 milliGRAM(s) Oral every 6 hours PRN Mild Pain (1 - 3)

## 2019-12-21 NOTE — PROGRESS NOTE ADULT - ASSESSMENT
89F PMH poorly controlled HTN, left eye glaucoma, dementia with sundowning  presents for weakness, decreased po intake and agitation starting one week after unwitnessed fall. Suspect TME 2/2 suspected polypharmacy (levofloxacin and xanax use) with bilateral pleural effusion and elevated BNP likely 2/2 longstanding HTN and undiagnosed CHF. Also incidental new Afib.

## 2019-12-21 NOTE — CONSULT NOTE ADULT - ASSESSMENT
89F PMH poorly controlled HTN, left eye glaucoma, dementia with sundowning episodes, here for failure to thrive, weakness and poor PO intake, found to have rate controlled Atrial Fibrillation    Atrial Fibrillation (Not new in Onset per Daughter)  -Patient with known Afib per daughter. [facility] diagnosed her mother > 20 years ago but they failed to follow up.  -Clinically patient is HD stable and asymptomatic Denies CP, SOB or palpitations. she is rate controlled. No AV meg agents are necessary at this time.   -Echo performed this hospitalization without evidence of LV dysfunction, normal RV function Mild AI  -CHADVASC of at least 4 placing patient at high thromboembolic risk. However patient with known history of falls and per daughter would likely defer AC if suggested to them.   -Given patient's declining functional status would recommend discussion between Patient's daughters, PMD, and Cardiology to see if AC is worth pursuing  -Please obtain TSH. Please obtain daily EKGS    Cardiology will continue to follow  All recommendations are Preliminary pending FInal attending addendum

## 2019-12-21 NOTE — DIETITIAN INITIAL EVALUATION ADULT. - ENERGY NEEDS
Height 60"; #; #; 116%IBW  BMI 24.4  ActualBW used for calculations as pt between % of IBW. Needs estimated for maintenance in older adults; adjusted for malnutrition

## 2019-12-21 NOTE — DIETITIAN INITIAL EVALUATION ADULT. - PROBLEM SELECTOR PLAN 7
Reports hx of poorly controlled HTN w/ glaucoma 2/2 HTN. BPs above goal.   -C/w Lisinopril 20    #CKD3  Reduced GFR stage 3, suspect CKD, will trend for now

## 2019-12-21 NOTE — DIETITIAN INITIAL EVALUATION ADULT. - NAME AND PHONE
Samantha Gitlin, RD, CDN, [facility], [phone] or available on [employer]

## 2019-12-21 NOTE — PROGRESS NOTE ADULT - PROBLEM SELECTOR PLAN 1
continue to monitor   decreased PO intake and mobility since fall 10 days ago  Physical Therapy - OOB to chair  Remeron for sleep

## 2019-12-21 NOTE — DIETITIAN INITIAL EVALUATION ADULT. - ADD RECOMMEND
1. Recommend liberalizing diet to Low Sodium and adding Ensure 1x/day (350kcal, 20g pro) 2. Encourage PO intake and provide assistance as necessary 3. Monitor lytes and replete prn. 4. Pain and bowel regimens per team 5. Cont. with remeron if no contraindication

## 2019-12-22 LAB
ANION GAP SERPL CALC-SCNC: 14 MMOL/L — SIGNIFICANT CHANGE UP (ref 5–17)
APTT BLD: 27.5 SEC — SIGNIFICANT CHANGE UP (ref 27.5–36.3)
BUN SERPL-MCNC: 17 MG/DL — SIGNIFICANT CHANGE UP (ref 7–23)
CALCIUM SERPL-MCNC: 9 MG/DL — SIGNIFICANT CHANGE UP (ref 8.4–10.5)
CHLORIDE SERPL-SCNC: 104 MMOL/L — SIGNIFICANT CHANGE UP (ref 96–108)
CO2 SERPL-SCNC: 23 MMOL/L — SIGNIFICANT CHANGE UP (ref 22–31)
CREAT SERPL-MCNC: 0.93 MG/DL — SIGNIFICANT CHANGE UP (ref 0.5–1.3)
GLUCOSE SERPL-MCNC: 105 MG/DL — HIGH (ref 70–99)
HCT VFR BLD CALC: 33.5 % — LOW (ref 34.5–45)
HGB BLD-MCNC: 11 G/DL — LOW (ref 11.5–15.5)
INR BLD: 1.24 — HIGH (ref 0.88–1.16)
MAGNESIUM SERPL-MCNC: 2 MG/DL — SIGNIFICANT CHANGE UP (ref 1.6–2.6)
MCHC RBC-ENTMCNC: 31.1 PG — SIGNIFICANT CHANGE UP (ref 27–34)
MCHC RBC-ENTMCNC: 32.8 GM/DL — SIGNIFICANT CHANGE UP (ref 32–36)
MCV RBC AUTO: 94.6 FL — SIGNIFICANT CHANGE UP (ref 80–100)
NRBC # BLD: 0 /100 WBCS — SIGNIFICANT CHANGE UP (ref 0–0)
PHOSPHATE SERPL-MCNC: 3.4 MG/DL — SIGNIFICANT CHANGE UP (ref 2.5–4.5)
PLATELET # BLD AUTO: 190 K/UL — SIGNIFICANT CHANGE UP (ref 150–400)
POTASSIUM SERPL-MCNC: 3.1 MMOL/L — LOW (ref 3.5–5.3)
POTASSIUM SERPL-SCNC: 3.1 MMOL/L — LOW (ref 3.5–5.3)
PROTHROM AB SERPL-ACNC: 14.1 SEC — HIGH (ref 10–12.9)
RBC # BLD: 3.54 M/UL — LOW (ref 3.8–5.2)
RBC # FLD: 14.9 % — HIGH (ref 10.3–14.5)
SODIUM SERPL-SCNC: 141 MMOL/L — SIGNIFICANT CHANGE UP (ref 135–145)
WBC # BLD: 7.15 K/UL — SIGNIFICANT CHANGE UP (ref 3.8–10.5)
WBC # FLD AUTO: 7.15 K/UL — SIGNIFICANT CHANGE UP (ref 3.8–10.5)

## 2019-12-22 RX ORDER — SODIUM CHLORIDE 9 MG/ML
250 INJECTION INTRAMUSCULAR; INTRAVENOUS; SUBCUTANEOUS ONCE
Refills: 0 | Status: COMPLETED | OUTPATIENT
Start: 2019-12-22 | End: 2019-12-22

## 2019-12-22 RX ORDER — CYCLOBENZAPRINE HYDROCHLORIDE 10 MG/1
5 TABLET, FILM COATED ORAL ONCE
Refills: 0 | Status: COMPLETED | OUTPATIENT
Start: 2019-12-22 | End: 2019-12-22

## 2019-12-22 RX ORDER — POTASSIUM CHLORIDE 20 MEQ
40 PACKET (EA) ORAL ONCE
Refills: 0 | Status: COMPLETED | OUTPATIENT
Start: 2019-12-22 | End: 2019-12-22

## 2019-12-22 RX ADMIN — Medication 40 MILLIEQUIVALENT(S): at 09:29

## 2019-12-22 RX ADMIN — LISINOPRIL 20 MILLIGRAM(S): 2.5 TABLET ORAL at 06:00

## 2019-12-22 RX ADMIN — POLYETHYLENE GLYCOL 3350 17 GRAM(S): 17 POWDER, FOR SOLUTION ORAL at 06:00

## 2019-12-22 RX ADMIN — CYCLOBENZAPRINE HYDROCHLORIDE 5 MILLIGRAM(S): 10 TABLET, FILM COATED ORAL at 11:52

## 2019-12-22 RX ADMIN — ENOXAPARIN SODIUM 30 MILLIGRAM(S): 100 INJECTION SUBCUTANEOUS at 09:29

## 2019-12-22 RX ADMIN — POLYETHYLENE GLYCOL 3350 17 GRAM(S): 17 POWDER, FOR SOLUTION ORAL at 17:29

## 2019-12-22 RX ADMIN — Medication 1 MILLIGRAM(S): at 11:14

## 2019-12-22 RX ADMIN — SODIUM CHLORIDE 250 MILLILITER(S): 9 INJECTION INTRAMUSCULAR; INTRAVENOUS; SUBCUTANEOUS at 11:05

## 2019-12-22 RX ADMIN — Medication 650 MILLIGRAM(S): at 23:02

## 2019-12-22 RX ADMIN — MIRTAZAPINE 7.5 MILLIGRAM(S): 45 TABLET, ORALLY DISINTEGRATING ORAL at 22:14

## 2019-12-22 RX ADMIN — Medication 650 MILLIGRAM(S): at 14:13

## 2019-12-22 RX ADMIN — Medication 650 MILLIGRAM(S): at 23:32

## 2019-12-22 RX ADMIN — SENNA PLUS 2 TABLET(S): 8.6 TABLET ORAL at 22:14

## 2019-12-22 NOTE — PROGRESS NOTE ADULT - PROBLEM SELECTOR PLAN 2
Unwitnessed fall. Currently delirious, unable to question. CT head with microangiopathic disease negative for acute process  -F/u TSH, B12, folate, RPR  -F/u PT consult     #Weakness   Suspect multifactorial due to TME and dementia baseline. Meeting 1/4 SIRS criteria with HR 92, low suspicion infection   -F/u TSH, B12, folate, RPR Unwitnessed fall. Currently delirious, unable to question. CT head with microangiopathic disease negative for acute process  -F/u TSH, B12, folate, RPR  -F/u PT consult     #Weakness   Suspect multifactorial due to TME and dementia baseline. Meeting 1/4 SIRS criteria with HR 92, low suspicion infection   -F/u TSH  -RPR negative  -vitamin b12 WNL, folate 4.4

## 2019-12-22 NOTE — PROGRESS NOTE ADULT - SUBJECTIVE AND OBJECTIVE BOX
OVERNIGHT EVENTS: ANTONELLA    SUBJECTIVE / INTERVAL HPI: Patient seen and examined at bedside. This AM patient complaining for L lower back pain. Daughter reports that she is having difficulty keeping food and liquids down, sometimes bringing up liquid she has tried to drink. She denies fever, chest pain, abdominal pain, dysuria    VITAL SIGNS:  Vital Signs Last 24 Hrs  T(C): 36.6 (22 Dec 2019 13:25), Max: 37 (22 Dec 2019 05:42)  T(F): 97.8 (22 Dec 2019 13:25), Max: 98.6 (22 Dec 2019 05:42)  HR: 77 (22 Dec 2019 13:25) (77 - 98)  BP: 142/74 (22 Dec 2019 13:25) (142/74 - 158/85)  BP(mean): --  RR: 18 (22 Dec 2019 13:25) (17 - 18)  SpO2: 96% (22 Dec 2019 13:25) (93% - 96%)        PHYSICAL EXAM:  General: NAD, frail elderly female,  laying on her R side, awake and alert  HEENT: NC/AT, anicteric sclera, mouth mildly dry  Neck: supple  Cardiovascular: +S1/S2, RRR, no murmurs or gallops  Respiratory: CTA B/L, no wheezing or crackles  Gastrointestinal: soft, NT/ND, +BSx4  Extremities: WWP, no edema, no cyanosis  Vascular: 2+ radial, DP/PT pulses B/L  Neurological: AAOx3, no focal deficits    MEDICATIONS  (STANDING):  enoxaparin Injectable 30 milliGRAM(s) SubCutaneous every 24 hours  folic acid 1 milliGRAM(s) Oral daily  lisinopril 20 milliGRAM(s) Oral every 24 hours  mirtazapine 7.5 milliGRAM(s) Oral at bedtime  polyethylene glycol 3350 17 Gram(s) Oral every 12 hours  senna 2 Tablet(s) Oral at bedtime    MEDICATIONS  (PRN):  acetaminophen   Tablet .. 650 milliGRAM(s) Oral every 6 hours PRN Mild Pain (1 - 3)    Allergies    No Known Allergies    Intolerances    Toprol-XL (Rash)      LABS:                        11.0   7.15  )-----------( 190      ( 22 Dec 2019 07:00 )             33.5     12-22    141  |  104  |  17  ----------------------------<  105<H>  3.1<L>   |  23  |  0.93    Ca    9.0      22 Dec 2019 07:00  Phos  3.4     12-22  Mg     2.0     12-22      PT/INR - ( 22 Dec 2019 07:00 )   PT: 14.1 sec;   INR: 1.24          PTT - ( 22 Dec 2019 07:00 )  PTT:27.5 sec    CAPILLARY BLOOD GLUCOSE              RADIOLOGY & ADDITIONAL TESTS: Reviewed.

## 2019-12-22 NOTE — PROGRESS NOTE ADULT - SUBJECTIVE AND OBJECTIVE BOX
CC: WEAKNESS; PLEURAL EFFUSION      INTERVAL HISTORY: patient more awake this AM  responsive to questions       ROS: No chest pain, no sob, no abd pain. No n/v/d    PAST MEDICAL & SURGICAL HISTORY:  Vaginal prolapse  Glaucoma  HTN (hypertension)  No significant past surgical history      PHYSICAL EXAM:  T(C): 37 (19 @ 05:42), Max: 37 (19 @ 05:42)  HR: 98 (19 @ 05:42)  BP: 155/84 (19 @ 05:42) (136/70 - 158/85)  RR: 18 (19 @ 05:42)  SpO2: 93% (19 @ 05:42)  Wt(kg): --  I&O's Summary    Weight 56.7 ( @ 18:03)  General:  NAD.  HEENT: moist mucous membranes, no pallor/cyanosis.  Neck: no JVD visible.  Cardiac: S1, S2. RRR. No murmurs   Respratory: CTA b/l, no access muscle use.   Abdomen: soft. nontender. nondistended  Skin: no rashes.  Extremities: no LE edema b/l  Access:       DATA:                        11.0<L>  7.15  )-----------( 190      ( 22 Dec 2019 07:00 )             33.5<L>        141    |  104    |  17     ----------------------------<  105<H>  Ca:9.0   (22 Dec 2019 07:00)  3.1<L>   |  23     |  0.93       eGFR if Non : 52 <L>  eGFR if : 61    TPro  6.6    /  Alb  3.6    /  TBili  1.0    /  DBili  x      /  AST  32     /  ALT  14     /  AlkPhos  96     19 Dec 2019 19:04        Urinalysis Basic - ( 19 Dec 2019 19:04 )  Color: Yellow / Appearance: SL Cloudy<!> / S.025 / pH: x  Gluc: x / Ketone: Trace mg/dL<!>  / Bili: Negative / Urobili: 0.2 E.U./dL   Blood: x / Protein: Trace mg/dL<!> / Nitrite: NEGATIVE   Leuk Esterase: NEGATIVE / RBC: < 5 /HPF / WBC < 5 /HPF   Sq Epi: x / Non Sq Epi: 0-5 /HPF / Bacteria: Present /HPF<!>                MEDICATIONS  (STANDING):  cyclobenzaprine 5 milliGRAM(s) Oral once  enoxaparin Injectable 30 milliGRAM(s) SubCutaneous every 24 hours  folic acid 1 milliGRAM(s) Oral daily  lisinopril 20 milliGRAM(s) Oral every 24 hours  mirtazapine 7.5 milliGRAM(s) Oral at bedtime  polyethylene glycol 3350 17 Gram(s) Oral every 12 hours  senna 2 Tablet(s) Oral at bedtime    MEDICATIONS  (PRN):  acetaminophen   Tablet .. 650 milliGRAM(s) Oral every 6 hours PRN Mild Pain (1 - 3)

## 2019-12-22 NOTE — PROGRESS NOTE ADULT - PROBLEM SELECTOR PLAN 2
anti-coagulation needs to be weighed given patient's poor overall status  risks may outweigh benefits  check TSH  for ?work-up of thyroid mass

## 2019-12-22 NOTE — PROGRESS NOTE ADULT - PROBLEM SELECTOR PLAN 1
poor PO intake  start D5 1/2NS until PO intake improves  seen by Speech and Swallow - recommend puree diet poor PO intake  start D5 1/2NS at 40cc/hr until PO intake improves  seen by Speech and Swallow - recommend puree diet

## 2019-12-22 NOTE — PROGRESS NOTE ADULT - ASSESSMENT
89F PMH poorly controlled HTN, left eye glaucoma, dementia with sundowning  presents for weakness, decreased po intake and agitation starting one week after unwitnessed fall. Suspect TME 2/2 suspected polypharmacy (levofloxacin and xanax use) with bilateral pleural effusion and elevated BNP likely 2/2 longstanding HTN and undiagnosed CHF, with incidental finding of AFib during this admission

## 2019-12-22 NOTE — SWALLOW BEDSIDE ASSESSMENT ADULT - ASR SWALLOW ASPIRATION MONITOR
oral hygiene/throat clearing/pneumonia/fever/upper respiratory infection/change of breathing pattern/position upright (90Y)/cough/gurgly voice

## 2019-12-22 NOTE — SWALLOW BEDSIDE ASSESSMENT ADULT - COMMENTS
PMH poorly controlled HTN, left eye glaucoma, dementia with sundowning  presents for weakness, decreased po intake and agitation starting one week after unwitnessed fall. Suspect TME 2/2 suspected polypharmacy (levofloxacin and xanax use) with bilateral pleural effusion and elevated BNP likely 2/2 longstanding HTN and undiagnosed CHF. Also incidental new Afib.   Per MD: pt AOx2 at baseline, conversive but behavior disturbances at night and sundowning.  CT head: microangiopathic disease negative for acute process.    Pt's daughters at bedside reported pt has been declining to eat for ~2-3 days since fall. Pt w/ good appetite prior to fall, tolerating soft chewable solids despite pt edentulous w/ ill-fitting upper dentures, no s/sx of dysphagia per daughters' report. Currently, given soft chewable solids (e.g. cake, bread), pt repeatedly spitting out bolus.

## 2019-12-22 NOTE — PROGRESS NOTE ADULT - PROBLEM SELECTOR PLAN 3
Pt presents with cough, can't sleep flat but unclear if due to orthopnea or chronic back pain issues. CT with large right and moderate left pleural effusions and dilated cardiac atria. Suspect fluid overload 2/2 CHF. Pt no CAD dx, not on ASA/statin, has reported rash to toprol   -AM EKG   -F/u echo Pt presents with cough, can't sleep flat but unclear if due to orthopnea or chronic back pain issues. CT with large right and moderate left pleural effusions and dilated cardiac atria. Suspect fluid overload 2/2 CHF. Pt no CAD dx, not on ASA/statin, has reported rash to toprol   -echo showing LVEF >75%, hyperdynamic

## 2019-12-22 NOTE — SWALLOW BEDSIDE ASSESSMENT ADULT - SWALLOW EVAL: RECOMMENDED FEEDING/EATING TECHNIQUES
oral hygiene/small sips/bites/allow for swallow between intakes/crush medication (when feasible)/maintain upright posture during/after eating for 30 mins/no straws/alternate food with liquid/position upright (90 degrees)

## 2019-12-22 NOTE — SWALLOW BEDSIDE ASSESSMENT ADULT - NS SPL SWALLOW CLINIC TRIAL FT
Oral phase characterized by reduced oral grading of spoon, min anterior spillage of thins, prolonged bolus manipulation/formation. Pt intermittently spoke w/ puree bolus in mouth despite cues for redirection 2/2 poor mental status. No overt s/sx of aspiration observed. Hyolaryngeal elevation appreciated upon palpation. Pt declined further trials despite encouragement and education.

## 2019-12-22 NOTE — SWALLOW BEDSIDE ASSESSMENT ADULT - SWALLOW EVAL: DIAGNOSIS
Eval limited given pt declined further PO trials. At least mild oral deficits. No overt s/sx of aspiration observed. Given report of pt consistently expectorating soft chewable solids, in addition to pt is edentulous, recs for puree diet. Given AMS, hx of dementia, and lethargy, aspiration precautions in place. This SVC will f/u to assess tolerance.

## 2019-12-23 VITALS
HEART RATE: 80 BPM | TEMPERATURE: 98 F | SYSTOLIC BLOOD PRESSURE: 128 MMHG | OXYGEN SATURATION: 98 % | RESPIRATION RATE: 18 BRPM | DIASTOLIC BLOOD PRESSURE: 63 MMHG

## 2019-12-23 LAB
ANION GAP SERPL CALC-SCNC: 13 MMOL/L — SIGNIFICANT CHANGE UP (ref 5–17)
BUN SERPL-MCNC: 19 MG/DL — SIGNIFICANT CHANGE UP (ref 7–23)
CALCIUM SERPL-MCNC: 8.7 MG/DL — SIGNIFICANT CHANGE UP (ref 8.4–10.5)
CHLORIDE SERPL-SCNC: 104 MMOL/L — SIGNIFICANT CHANGE UP (ref 96–108)
CO2 SERPL-SCNC: 23 MMOL/L — SIGNIFICANT CHANGE UP (ref 22–31)
CREAT SERPL-MCNC: 0.93 MG/DL — SIGNIFICANT CHANGE UP (ref 0.5–1.3)
GLUCOSE SERPL-MCNC: 91 MG/DL — SIGNIFICANT CHANGE UP (ref 70–99)
HCT VFR BLD CALC: 32.7 % — LOW (ref 34.5–45)
HGB BLD-MCNC: 10.6 G/DL — LOW (ref 11.5–15.5)
MAGNESIUM SERPL-MCNC: 2 MG/DL — SIGNIFICANT CHANGE UP (ref 1.6–2.6)
MCHC RBC-ENTMCNC: 31.4 PG — SIGNIFICANT CHANGE UP (ref 27–34)
MCHC RBC-ENTMCNC: 32.4 GM/DL — SIGNIFICANT CHANGE UP (ref 32–36)
MCV RBC AUTO: 96.7 FL — SIGNIFICANT CHANGE UP (ref 80–100)
NRBC # BLD: 0 /100 WBCS — SIGNIFICANT CHANGE UP (ref 0–0)
PHOSPHATE SERPL-MCNC: 3.3 MG/DL — SIGNIFICANT CHANGE UP (ref 2.5–4.5)
PLATELET # BLD AUTO: 166 K/UL — SIGNIFICANT CHANGE UP (ref 150–400)
POTASSIUM SERPL-MCNC: 3.1 MMOL/L — LOW (ref 3.5–5.3)
POTASSIUM SERPL-SCNC: 3.1 MMOL/L — LOW (ref 3.5–5.3)
RBC # BLD: 3.38 M/UL — LOW (ref 3.8–5.2)
RBC # FLD: 14.8 % — HIGH (ref 10.3–14.5)
SODIUM SERPL-SCNC: 140 MMOL/L — SIGNIFICANT CHANGE UP (ref 135–145)
WBC # BLD: 6.17 K/UL — SIGNIFICANT CHANGE UP (ref 3.8–10.5)
WBC # FLD AUTO: 6.17 K/UL — SIGNIFICANT CHANGE UP (ref 3.8–10.5)

## 2019-12-23 PROCEDURE — 71260 CT THORAX DX C+: CPT

## 2019-12-23 PROCEDURE — 36415 COLL VENOUS BLD VENIPUNCTURE: CPT

## 2019-12-23 PROCEDURE — 72170 X-RAY EXAM OF PELVIS: CPT

## 2019-12-23 PROCEDURE — 82746 ASSAY OF FOLIC ACID SERUM: CPT

## 2019-12-23 PROCEDURE — 93306 TTE W/DOPPLER COMPLETE: CPT

## 2019-12-23 PROCEDURE — 72125 CT NECK SPINE W/O DYE: CPT

## 2019-12-23 PROCEDURE — 99233 SBSQ HOSP IP/OBS HIGH 50: CPT

## 2019-12-23 PROCEDURE — 70450 CT HEAD/BRAIN W/O DYE: CPT

## 2019-12-23 PROCEDURE — 85610 PROTHROMBIN TIME: CPT

## 2019-12-23 PROCEDURE — 81001 URINALYSIS AUTO W/SCOPE: CPT

## 2019-12-23 PROCEDURE — 82607 VITAMIN B-12: CPT

## 2019-12-23 PROCEDURE — 85730 THROMBOPLASTIN TIME PARTIAL: CPT

## 2019-12-23 PROCEDURE — 84100 ASSAY OF PHOSPHORUS: CPT

## 2019-12-23 PROCEDURE — 71045 X-RAY EXAM CHEST 1 VIEW: CPT

## 2019-12-23 PROCEDURE — 73521 X-RAY EXAM HIPS BI 2 VIEWS: CPT

## 2019-12-23 PROCEDURE — 86901 BLOOD TYPING SEROLOGIC RH(D): CPT

## 2019-12-23 PROCEDURE — 76536 US EXAM OF HEAD AND NECK: CPT

## 2019-12-23 PROCEDURE — 83880 ASSAY OF NATRIURETIC PEPTIDE: CPT

## 2019-12-23 PROCEDURE — 85025 COMPLETE CBC W/AUTO DIFF WBC: CPT

## 2019-12-23 PROCEDURE — 86780 TREPONEMA PALLIDUM: CPT

## 2019-12-23 PROCEDURE — 86850 RBC ANTIBODY SCREEN: CPT

## 2019-12-23 PROCEDURE — 84443 ASSAY THYROID STIM HORMONE: CPT

## 2019-12-23 PROCEDURE — 87086 URINE CULTURE/COLONY COUNT: CPT

## 2019-12-23 PROCEDURE — 74177 CT ABD & PELVIS W/CONTRAST: CPT

## 2019-12-23 PROCEDURE — 80053 COMPREHEN METABOLIC PANEL: CPT

## 2019-12-23 PROCEDURE — 99285 EMERGENCY DEPT VISIT HI MDM: CPT

## 2019-12-23 PROCEDURE — 86900 BLOOD TYPING SEROLOGIC ABO: CPT

## 2019-12-23 PROCEDURE — 83735 ASSAY OF MAGNESIUM: CPT

## 2019-12-23 PROCEDURE — 85027 COMPLETE CBC AUTOMATED: CPT

## 2019-12-23 PROCEDURE — 80048 BASIC METABOLIC PNL TOTAL CA: CPT

## 2019-12-23 RX ORDER — POTASSIUM CHLORIDE 20 MEQ
40 PACKET (EA) ORAL ONCE
Refills: 0 | Status: COMPLETED | OUTPATIENT
Start: 2019-12-23 | End: 2019-12-23

## 2019-12-23 RX ORDER — POLYETHYLENE GLYCOL 3350 17 G/17G
17 POWDER, FOR SOLUTION ORAL
Qty: 0 | Refills: 0 | DISCHARGE
Start: 2019-12-23

## 2019-12-23 RX ORDER — LISINOPRIL 2.5 MG/1
20 TABLET ORAL ONCE
Refills: 0 | Status: COMPLETED | OUTPATIENT
Start: 2019-12-23 | End: 2019-12-23

## 2019-12-23 RX ORDER — POTASSIUM CHLORIDE 20 MEQ
10 PACKET (EA) ORAL
Refills: 0 | Status: DISCONTINUED | OUTPATIENT
Start: 2019-12-23 | End: 2019-12-23

## 2019-12-23 RX ORDER — MIRTAZAPINE 45 MG/1
1 TABLET, ORALLY DISINTEGRATING ORAL
Qty: 30 | Refills: 0
Start: 2019-12-23 | End: 2020-01-21

## 2019-12-23 RX ORDER — FOLIC ACID 0.8 MG
1 TABLET ORAL
Qty: 30 | Refills: 0
Start: 2019-12-23 | End: 2020-01-21

## 2019-12-23 RX ORDER — SODIUM CHLORIDE 9 MG/ML
1000 INJECTION, SOLUTION INTRAVENOUS
Refills: 0 | Status: DISCONTINUED | OUTPATIENT
Start: 2019-12-23 | End: 2019-12-23

## 2019-12-23 RX ADMIN — Medication 100 MILLIEQUIVALENT(S): at 11:51

## 2019-12-23 RX ADMIN — LISINOPRIL 20 MILLIGRAM(S): 2.5 TABLET ORAL at 02:50

## 2019-12-23 RX ADMIN — Medication 1 MILLIGRAM(S): at 11:06

## 2019-12-23 RX ADMIN — POLYETHYLENE GLYCOL 3350 17 GRAM(S): 17 POWDER, FOR SOLUTION ORAL at 05:37

## 2019-12-23 RX ADMIN — ENOXAPARIN SODIUM 30 MILLIGRAM(S): 100 INJECTION SUBCUTANEOUS at 08:38

## 2019-12-23 RX ADMIN — Medication 40 MILLIEQUIVALENT(S): at 08:38

## 2019-12-23 RX ADMIN — Medication 40 MILLIEQUIVALENT(S): at 11:50

## 2019-12-23 RX ADMIN — LISINOPRIL 20 MILLIGRAM(S): 2.5 TABLET ORAL at 05:37

## 2019-12-23 NOTE — PROGRESS NOTE ADULT - SUBJECTIVE AND OBJECTIVE BOX
CC: WEAKNESS; PLEURAL EFFUSION      INTERVAL HISTORY: 89 yo lady admitted with many comorbidities at advanced age.  pt admitted with poor intake, confusion, falls with resultant left hip and thigh pain, and the finding of pleural effusions.   since admission the family has been ambivalent about evaluating or treating any of above, and this am declined neuro consult to assess confusion, and stated that under no circumstances would they allow such diagnostic evaln as EEG, or rx of any findings which could emerge from w/u.   as she is comfortable, and family resists and declines any eval or rx which would be formulated from hosp adm, they are contemplating taking her home with out further w/u or rx.     ROS: No chest pain. No shortness of breath. No nausea.    PAST MEDICAL & SURGICAL HISTORY:  Vaginal prolapse  Glaucoma  HTN (hypertension)  No significant past surgical history      PHYSICAL EXAM:  T(C): 36.8 (19 @ 12:34), Max: 36.8 (19 @ 12:34)  HR: 80 (19 @ 12:34)  BP: 128/63 (19 @ 12:34) (128/63 - 176/81)  RR: 18 (19 @ 12:34)  SpO2: 98% (19 @ 12:34)  Wt(kg): --  I&O's Summary    Weight 56.7 ( @ 18:03)    Appearance: alert, pleasant, INAD.  ENT: oral mucosa moist, no pallor/cyanosis.  Neck: no JVD visible.  Cardiac: no rubs. no murmurs. Extremities: NO EDEMA.  Skin: no rashes.  Extremities (digits): no clubbing or cyanosis.  Respratory effort: no access muscle use. Lungs: CLEAR TO ausc to pal's.  Abdomen: soft. nontender. no masses.  Psych affect: not depressed. Orientation: person, place, situation.     MEDICATIONS  (STANDING):  dextrose 5% + sodium chloride 0.45%. 1000 milliLiter(s) (40 mL/Hr) IV Continuous <Continuous>  enoxaparin Injectable 30 milliGRAM(s) SubCutaneous every 24 hours  folic acid 1 milliGRAM(s) Oral daily  lisinopril 20 milliGRAM(s) Oral every 24 hours  mirtazapine 7.5 milliGRAM(s) Oral at bedtime  polyethylene glycol 3350 17 Gram(s) Oral every 12 hours  potassium chloride  10 mEq/100 mL IVPB 10 milliEquivalent(s) IV Intermittent every 1 hour  senna 2 Tablet(s) Oral at bedtime    MEDICATIONS  (PRN):  acetaminophen   Tablet .. 650 milliGRAM(s) Oral every 6 hours PRN Mild Pain (1 - 3)      DATA:  140    |  104    |  19     ----------------------------<  91     Ca:8.7   (23 Dec 2019 10:47)  3.1<L>   |  23     |  0.93       eGFR if Non : 52 <L>  eGFR if : 61                            10.6<L>  6.17  )-----------( 166      ( 23 Dec 2019 10:47 )             32.7<L>        Urinalysis Basic - ( 19 Dec 2019 19:04 )  Color: Yellow / Appearance: SL Cloudy<!> / S.025 / pH: x  Gluc: x / Ketone: Trace mg/dL<!>  / Bili: Negative / Urobili: 0.2 E.U./dL   Blood: x / Protein: Trace mg/dL<!> / Nitrite: NEGATIVE   Leuk Esterase: NEGATIVE / RBC: < 5 /HPF / WBC < 5 /HPF   Sq Epi: x / Non Sq Epi: 0-5 /HPF / Bacteria: Present /HPF<!>

## 2019-12-23 NOTE — DISCHARGE NOTE PROVIDER - CARE PROVIDER_API CALL
Jose Calvillo)  Internal Medicine; Nephrology  [address], [address], [place] [address]  Phone: [phone]  Fax: [phone]  Follow Up Time: Jose Calvillo)  Internal Medicine; Nephrology  [address], [address], [place] [address]  Phone: [phone]  Fax: [phone]  Follow Up Time:

## 2019-12-23 NOTE — PROGRESS NOTE ADULT - PROBLEM SELECTOR PROBLEM 2
Atrial fibrillation with controlled ventricular rate
Atrial fibrillation with controlled ventricular rate
Fall

## 2019-12-23 NOTE — DISCHARGE NOTE PROVIDER - HOSPITAL COURSE
Patient is __ yo M/F with past medical history of _____    Presented with _____, found to have _____    Problem List/Main Diagnoses (system-based):     Inpatient treatment course:     New medications:     Labs to be followed outpatient:     Exam to be followed outpatient: Patient is 89 y/o F with past medical history of poorly controlled HTN, left eye glaucoma, and dementia with sundowning.    Presented with weakness, decreased po intake and agitation starting one week after unwitnessed fall, found to have delirium in the setting of toxic metabolic encephalopathy 2/2 suspected polypharmacy (levofloxacin and xanax use) with bilateral pleural effusion and elevated BNP likely 2/2 longstanding HTN and undiagnosed CHF, with incidental finding of AFib during this admission.        Problem List/Main Diagnoses (system-based) & Inpatient treatment course:     1. Toxic metabolic encephalopathy - Daughter reports behavioral disturbance worsening acutely with levofloxacin, known rare side effect, also took one xanax which also worsened behavior. Pt with vaginal prolapse and recurrent UTI, UA negative but could be sterilized by levo use.  will hold neuroleptics     -Monitor clinically     -Serial EKGs.         2. Fall - Unwitnessed fall. Currently delirious, unable to question. CT head with microangiopathic disease negative for acute process    -TSH 0.587, B12 369, folate 4.4    -RPR negative    -F/u PT consult        #Weakness     Suspect multifactorial due to TME and dementia baseline. Meeting 1/4 SIRS criteria with HR 92, low suspicion infection     -F/u TSH    -RPR negative    -vitamin b12 WNL, folate 4.4.         Problem/Plan - 3:    ·  Problem: Pleural effusion.  Plan: Pt presents with cough, can't sleep flat but unclear if due to orthopnea or chronic back pain issues. CT with large right and moderate left pleural effusions and dilated cardiac atria. Suspect fluid overload 2/2 CHF. Pt no CAD dx, not on ASA/statin, has reported rash to toprol     -echo showing LVEF >75%, hyperdynamic.         Problem/Plan - 4:    ·  Problem: Atrial fibrillation with controlled ventricular rate.  Plan: EKG in ED with Afib 82bph, incomplete LBBB, left axis deviation, , poor R wave progression. No known Afib hx. CHADsVAS 2-3 would qualify for AC however as recurrent falls and joint effusion will hold AC tonight and discuss with family in AM    -AM EKG.          Problem/Plan - 5:    ·  Problem: Joint effusion of shoulder region, right.  Plan: Advanced right glenohumeral arthrosis with large joint effusion. Unable to assess if acute vs chronic, moving all extremities, c/o pain "all over"     -Call Ortho in AM to assess if joint is candidate for tap     -Tylenol for pain control.          Problem/Plan - 6:    Problem: Thyroid mass of unclear etiology. Plan: CT with 4.7 cm partially calcified mass in the left lobe of the thyroid, possibly a neoplasm.     -F/u TSH        #Hiatal hernia     Massive hiatal hernia with intrathoracic stomach. Daughters deny hx of GERD.         Problem/Plan - 7:    ·  Problem: HTN (hypertension).  Plan: Reports hx of poorly controlled HTN w/ glaucoma 2/2 HTN. BPs above goal.     -C/w Lisinopril 20        #CKD3    Reduced GFR stage 3, suspect CKD, will trend for now.          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  Plan: Daughter will bring eye drops.                 New medications:     Labs to be followed outpatient:     Exam to be followed outpatient: Patient is 89 y/o F with past medical history of poorly controlled HTN, left eye glaucoma, and dementia with sundowning.    Presented with weakness, decreased po intake and agitation starting one week after unwitnessed fall, found to have delirium in the setting of toxic metabolic encephalopathy 2/2 suspected polypharmacy (levofloxacin and xanax use) with bilateral pleural effusion and elevated BNP likely 2/2 longstanding HTN and undiagnosed CHF, with incidental finding of AFib during this admission.        Problem List/Main Diagnoses (system-based) & Inpatient treatment course:     1. Toxic metabolic encephalopathy - Daughter reports behavioral disturbance worsening acutely with levofloxacin, known rare side effect, also took one xanax which also worsened behavior. Pt with vaginal prolapse and recurrent UTI, UA negative but could be sterilized by levo use.  will hold neuroleptics     -Monitor clinically     -Serial EKGs.         2. Fall - Unwitnessed fall. Currently delirious, unable to question. CT head with microangiopathic disease negative for acute process    -TSH 0.587, B12 369, folate 4.4    -started on folic acid    -RPR negative    -F/u PT consult        3. Pleural effusion - Pt presents with cough, can't sleep flat but unclear if due to orthopnea or chronic back pain issues. CT with large right and moderate left pleural effusions and dilated cardiac atria. Suspect fluid overload 2/2 CHF. Pt no CAD dx, not on ASA/statin, has reported rash to toprol     -echo showing LVEF >75%, hyperdynamic.        4. Atrial fibrillation with controlled ventricular rate - EKG in ED with Afib 82bph, incomplete LBBB, left axis deviation, , poor R wave progression. No known Afib hx. CHADsVAS 2-3 would qualify for AC however as recurrent falls and joint effusion will hold AC tonight and discuss with family in AM    -AM EKG.         5. Joint effusion of shoulder region, right - Advanced right glenohumeral arthrosis with large joint effusion. Unable to assess if acute vs chronic, moving all extremities, c/o pain "all over"     -Ortho consulted to assess if joint is candidate for tap     -Tylenol for pain control.        6. Thyroid mass of unclear etiology - CT with 4.7 cm partially calcified mass in the left lobe of the thyroid, possibly a neoplasm.     -TSH 0.587    -Outpatient followup        7. Hiatal hernia     -Massive hiatal hernia with intrathoracic stomach. Daughters deny hx of GERD.        8. HTN (hypertension) - Reports hx of poorly controlled HTN w/ glaucoma 2/2 HTN. BPs above goal.     -C/w Lisinopril 20        9. CKD3    -Reduced GFR stage 3, suspect CKD, will trend for now.         10. 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                 New medications: mirtazapine and folic acid    Labs to be followed outpatient: As per followup with Dr. Calvillo    Exam to be followed outpatient: As per followup with Dr. Calvillo

## 2019-12-23 NOTE — DISCHARGE NOTE PROVIDER - NSDCFUADDAPPT_GEN_ALL_CORE_FT
Please followup with Dr. Calvillo within 1-2 weeks, we were unable to schedule an appointment at the time of your discharge.    Please also followup with Dr. Landers on January 13 at 11:30am.

## 2019-12-23 NOTE — DISCHARGE NOTE PROVIDER - NSDCMRMEDTOKEN_GEN_ALL_CORE_FT
lisinopril 20 mg oral tablet: 1 tab(s) orally once a day folic acid 1 mg oral tablet: 1 tab(s) orally once a day  lisinopril 20 mg oral tablet: 1 tab(s) orally once a day  mirtazapine 7.5 mg oral tablet: 1 tab(s) orally once a day (at bedtime)  polyethylene glycol 3350 oral powder for reconstitution: 17 gram(s) orally every 12 hours

## 2019-12-23 NOTE — PROGRESS NOTE ADULT - ASSESSMENT
have had long discussion with pt's daughter.  at this time family not contemplating thoracentesis, declines neuro eval, has refused bone scan, and states they do not consider PEG to be acceptable future option ( which has not been recommended to them at this time, but is not to be considered in any case)..    they are likely moving toward a family decision to take her home and continue comfort care at home.  they understand that we will be available to assist as desired in any imminent magt decision. if bp continues elevated may consider increased meds.

## 2019-12-23 NOTE — DISCHARGE NOTE NURSING/CASE MANAGEMENT/SOCIAL WORK - PATIENT PORTAL LINK FT
You can access the FollowMyHealth Patient Portal offered by [facility] by registering at the following website: [email or web address]. By joining [facility]’s FollowMyHealth portal, you will also be able to view your health information using other applications (apps) compatible with our system.

## 2019-12-24 DIAGNOSIS — H40.9 UNSPECIFIED GLAUCOMA: ICD-10-CM

## 2019-12-24 DIAGNOSIS — M79.605 PAIN IN LEFT LEG: ICD-10-CM

## 2019-12-27 DIAGNOSIS — H40.2223 CHRONIC ANGLE-CLOSURE GLAUCOMA, LEFT EYE, SEVERE STAGE: ICD-10-CM

## 2019-12-27 DIAGNOSIS — F03.90 UNSPECIFIED DEMENTIA WITHOUT BEHAVIORAL DISTURBANCE: ICD-10-CM

## 2019-12-27 DIAGNOSIS — T50.995A ADVERSE EFFECT OF OTHER DRUGS, MEDICAMENTS AND BIOLOGICAL SUBSTANCES, INITIAL ENCOUNTER: ICD-10-CM

## 2019-12-27 DIAGNOSIS — R53.1 WEAKNESS: ICD-10-CM

## 2019-12-27 DIAGNOSIS — I48.91 UNSPECIFIED ATRIAL FIBRILLATION: ICD-10-CM

## 2019-12-27 DIAGNOSIS — M25.411 EFFUSION, RIGHT SHOULDER: ICD-10-CM

## 2019-12-27 DIAGNOSIS — F05 DELIRIUM DUE TO KNOWN PHYSIOLOGICAL CONDITION: ICD-10-CM

## 2019-12-27 DIAGNOSIS — Z87.440 PERSONAL HISTORY OF URINARY (TRACT) INFECTIONS: ICD-10-CM

## 2019-12-27 DIAGNOSIS — I12.9 HYPERTENSIVE CHRONIC KIDNEY DISEASE WITH STAGE 1 THROUGH STAGE 4 CHRONIC KIDNEY DISEASE, OR UNSPECIFIED CHRONIC KIDNEY DISEASE: ICD-10-CM

## 2019-12-27 DIAGNOSIS — Z91.81 HISTORY OF FALLING: ICD-10-CM

## 2019-12-27 DIAGNOSIS — E43 UNSPECIFIED SEVERE PROTEIN-CALORIE MALNUTRITION: ICD-10-CM

## 2019-12-27 DIAGNOSIS — N18.3 CHRONIC KIDNEY DISEASE, STAGE 3 (MODERATE): ICD-10-CM

## 2019-12-27 DIAGNOSIS — E07.89 OTHER SPECIFIED DISORDERS OF THYROID: ICD-10-CM

## 2019-12-27 DIAGNOSIS — G92 TOXIC ENCEPHALOPATHY: ICD-10-CM

## 2019-12-27 DIAGNOSIS — J90 PLEURAL EFFUSION, NOT ELSEWHERE CLASSIFIED: ICD-10-CM

## 2021-04-29 NOTE — PROGRESS NOTE ADULT - PROBLEM/PLAN-9
Stable  Continue lisinopril 20 mg daily and carvedilol 180 mg daily  Re-evaluate in 6 months  DISPLAY PLAN FREE TEXT

## 2021-04-30 NOTE — ED PROVIDER NOTE - NS ED SCRIBE STATEMENT
Take prescription of meloxicam daily for at least 1 week as discussed.  May take Tylenol/acetaminophen for breakthrough pain.  Wear splint and ice as discussed.  Recommend orthopedics referral if not noting any improvement over the next week.    Patient Education     De Quervain Tenosynovitis    De Quervain tenosynovitis is inflammation of tendons and synovium on the thumb side of the wrist. Tendons are fibers that attach muscle to bone. Synovium is a slick membrane that helps tendons move. Movements done over and over can irritate and inflame these tissues. This can cause pain when you touch or grasp objects, turn or twist your wrist, or make a fist. You may also have pain and swelling near the base of the thumb or numbness along the back of your thumb and index finger. You may also feel the thumb catch or snap when you move it.  Treatment will depend on how bad the pain is. It can often be treated with medicines, injections, splinting, and home care. If your case is severe, you may be referred to a specialist to talk about surgery.  Home care  Your healthcare provider may prescribe medicines to relieve pain and reduce inflammation. A steroid medicine may be injected near the tendons. This reduces swelling. The healthcare provider may also suggest taking over-the-counter medicines like ibuprofen or naproxen. These help reduce inflammation. Take all medicines only as directed.  The following are general care guidelines:  · Avoid repetitive movements of your wrist and thumb.  · Note any activity that causes pain or swelling. If possible, avoid or limit that activity.  · Put a cold pack on your thumb. You can make your own cold pack by wrapping a plastic bag of ice or bag of frozen vegetables in a thin towel. Hold this to your thumb for up to 20 minutes at a time. Don't put ice directly on the skin.  · Your healthcare provider may put a splint on the thumb to hold it still. Use the splint as you have been instructed.  In some cases, you may need to use a splint 24 hours a day for 4 to 6 weeks. This will allow the wrist and thumb to heal.  Follow-up care  Follow up with your healthcare provider, or as advised. You may need more treatment if your injury is severe or if your symptoms don't get better. This additional treatment may include local injections, physical therapy, and surgery.  When to seek medical advice  Call your healthcare provider right away if any of these occur:  · Increase in pain or swelling  · If you have fever, chills, redness, warmth, or drainage  · Symptoms get worse after taking medicine  · Pain spreads farther down the thumb or into the forearm  · Pain continues to get in the way of daily life  Date Last Reviewed: 1/18/2016  © 4270-0140 [employer], [email or web address]. [address] [place] [address], [place] [address]. All rights reserved. This information is not intended as a substitute for professional medical care. Always follow your healthcare professional's instructions.            Attending

## 2023-06-16 NOTE — H&P ADULT - NSHPLABSRESULTS_GEN_ALL_CORE
meyer hija fueron negativos. Akanskha tiene jono meyer dolor. Jeremiah un seguimiento con meyer proveedor de atención primaria si los síntomas no mejoran.  
.  LABS:                         11.9   9.72  )-----------( 211      ( 19 Dec 2019 19:04 )             37.1         140  |  99  |  16  ----------------------------<  94  3.6   |  27  |  1.08    Ca    9.6      19 Dec 2019 19:04    TPro  6.6  /  Alb  3.6  /  TBili  1.0  /  DBili  x   /  AST  32  /  ALT  14  /  AlkPhos  96      PT/INR - ( 19 Dec 2019 19:04 )   PT: 14.7 sec;   INR: 1.29          PTT - ( 19 Dec 2019 19:04 )  PTT:26.1 sec  Urinalysis Basic - ( 19 Dec 2019 19:04 )    Color: Yellow / Appearance: SL Cloudy / S.025 / pH: x  Gluc: x / Ketone: Trace mg/dL  / Bili: Negative / Urobili: 0.2 E.U./dL   Blood: x / Protein: Trace mg/dL / Nitrite: NEGATIVE   Leuk Esterase: NEGATIVE / RBC: < 5 /HPF / WBC < 5 /HPF   Sq Epi: x / Non Sq Epi: 0-5 /HPF / Bacteria: Present /HPF    < from: CT Abdomen and Pelvis w/ IV Cont (19 @ 22:04) >    Impression:   1.  Large right and moderate left pleural effusions. Dilated cardiac atria.  2.  4.7 cm mass in the left lobe of the thyroid.  3.  Massive hiatal hernia with intrathoracic stomach.  4.  Extensive atherosclerotic plaque.  5.  Suspected vaginal prolapse.  6.  Advanced right glenohumeral arthrosis with large joint effusion.        < end of copied text >    < from: CT Head No Cont (19 @ 22:02) >    1.  No acute intracranial hemorrhage or calvarial fracture.  2.  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    < end of copied text >    < from: CT Cervical Spine No Cont (19 @ 22:02) >    IMPRESSION:   1.  No acute osseous injury.  2.  4.7 cm partially calcified mass in the left lobe of the thyroid, possibly a neoplasm.  3.  Moderate to severe degenerative changes.    < end of copied text >

## 2024-08-26 NOTE — PROGRESS NOTE ADULT - PROBLEM SELECTOR PLAN 7
skin suture
Reports hx of poorly controlled HTN w/ glaucoma 2/2 HTN. BPs above goal.   -C/w Lisinopril 20    #CKD3  Reduced GFR stage 3, suspect CKD, will trend for now